# Patient Record
Sex: FEMALE | Race: WHITE | URBAN - METROPOLITAN AREA
[De-identification: names, ages, dates, MRNs, and addresses within clinical notes are randomized per-mention and may not be internally consistent; named-entity substitution may affect disease eponyms.]

---

## 2017-01-17 ENCOUNTER — HISTORICAL (OUTPATIENT)
Dept: INTERNAL MEDICINE | Facility: CLINIC | Age: 47
End: 2017-01-17

## 2017-02-10 ENCOUNTER — HISTORICAL (OUTPATIENT)
Dept: INTERNAL MEDICINE | Facility: CLINIC | Age: 47
End: 2017-02-10

## 2017-02-10 LAB
CHOLEST/HDLC SERPL: 7.9 {RATIO} (ref 0–4.4)
CREAT SERPL-MCNC: 1.92 MG/DL (ref 0.7–1.1)

## 2017-03-09 ENCOUNTER — HISTORICAL (OUTPATIENT)
Dept: INTERNAL MEDICINE | Facility: CLINIC | Age: 47
End: 2017-03-09

## 2017-04-26 ENCOUNTER — HISTORICAL (OUTPATIENT)
Dept: SURGERY | Facility: HOSPITAL | Age: 47
End: 2017-04-26

## 2017-04-26 LAB
HCT VFR BLD AUTO: 35 % (ref 35–46)
HGB BLD-MCNC: 11.4 GM/DL (ref 12–16)
LYMPHOCYTES NFR BLD AUTO: 22 % (ref 15–40)
MCH RBC QN AUTO: 28 PG (ref 26–34)
MCHC RBC AUTO-ENTMCNC: 32.6 GM/DL (ref 31–37)
MONOCYTES NFR BLD AUTO: 5 % (ref 4–12)
RBC # BLD AUTO: 4.07 X10(6)/MCL (ref 4–5.2)
WBC # SPEC AUTO: 9.1 X10(3)/MCL (ref 4.5–11)

## 2017-05-15 ENCOUNTER — HISTORICAL (OUTPATIENT)
Dept: ADMINISTRATIVE | Facility: HOSPITAL | Age: 47
End: 2017-05-15

## 2017-05-15 LAB
ALBUMIN SERPL-MCNC: 3.2 GM/DL (ref 3.4–5)
ALBUMIN/GLOB SERPL: 1 RATIO (ref 1–2)
ALP SERPL-CCNC: 78 UNIT/L (ref 20–120)
ALT SERPL-CCNC: 19 UNIT/L
APPEARANCE, UA: CLEAR
AST SERPL-CCNC: 17 UNIT/L
BACTERIA #/AREA URNS AUTO: ABNORMAL /[HPF]
BILIRUB SERPL-MCNC: 0.7 MG/DL
BILIRUB UR QL STRIP: NEGATIVE
BILIRUBIN DIRECT+TOT PNL SERPL-MCNC: <0.1 MG/DL
BILIRUBIN DIRECT+TOT PNL SERPL-MCNC: >0.6 MG/DL
BUN SERPL-MCNC: 36 MG/DL (ref 7–25)
CALCIUM SERPL-MCNC: 9.4 MG/DL (ref 8.4–10.3)
CHLORIDE SERPL-SCNC: 103 MMOL/L (ref 96–110)
CO2 SERPL-SCNC: 29 MMOL/L (ref 24–32)
COLOR UR: ABNORMAL
CREAT SERPL-MCNC: 1.63 MG/DL (ref 0.7–1.1)
CREAT UR-MCNC: 71.2 MG/DL
EST. AVERAGE GLUCOSE BLD GHB EST-MCNC: 289 MG/DL
GLOBULIN SER-MCNC: 3.6 GM/ML (ref 2.3–3.5)
GLUCOSE (UA): >1000 MG/DL
GLUCOSE SERPL-MCNC: 323 MG/DL (ref 65–99)
HBA1C MFR BLD: 11.7 % (ref 4.7–5.6)
HGB UR QL STRIP: 0.1 MG/DL
HYALINE CASTS #/AREA URNS LPF: ABNORMAL /[LPF]
INR PPP: 2.19 (ref 0.9–1.2)
KETONES UR QL STRIP: NEGATIVE
LEUKOCYTE ESTERASE UR QL STRIP: NEGATIVE
NITRITE UR QL STRIP: NEGATIVE
PH UR STRIP: 7 [PH] (ref 4.5–8)
POTASSIUM SERPL-SCNC: 4.9 MMOL/L (ref 3.6–5.2)
PROT SERPL-MCNC: 6.8 GM/DL (ref 6–8)
PROT UR QL STRIP: 300 MG/DL
PROT UR STRIP-MCNC: 286 MG/DL
PROT/CREAT UR-RTO: 4016.9 MG/GM
PROTHROMBIN TIME: 24 SECOND(S) (ref 11.9–14.4)
RBC #/AREA URNS AUTO: ABNORMAL /[HPF]
SODIUM SERPL-SCNC: 139 MMOL/L (ref 135–146)
SP GR UR STRIP: 1.01 (ref 1–1.03)
SQUAMOUS #/AREA URNS LPF: ABNORMAL /[LPF]
TSH SERPL-ACNC: 4.19 MIU/L (ref 0.5–5)
UROBILINOGEN UR STRIP-ACNC: NORMAL
WBC #/AREA URNS AUTO: ABNORMAL /HPF

## 2017-07-11 ENCOUNTER — HISTORICAL (OUTPATIENT)
Dept: ADMINISTRATIVE | Facility: HOSPITAL | Age: 47
End: 2017-07-11

## 2017-07-11 LAB
ABS NEUT (OLG): 3.67 X10(3)/MCL (ref 2.1–9.2)
ALBUMIN SERPL-MCNC: 3 GM/DL (ref 3.4–5)
ALBUMIN/GLOB SERPL: 1 RATIO (ref 1–2)
ALP SERPL-CCNC: 96 UNIT/L (ref 45–117)
ALT SERPL-CCNC: 24 UNIT/L (ref 12–78)
AST SERPL-CCNC: 15 UNIT/L (ref 15–37)
BASOPHILS # BLD AUTO: 0.08 X10(3)/MCL
BASOPHILS NFR BLD AUTO: 1 % (ref 0–1)
BILIRUB SERPL-MCNC: 0.2 MG/DL (ref 0.2–1)
BILIRUBIN DIRECT+TOT PNL SERPL-MCNC: <0.1 MG/DL
BILIRUBIN DIRECT+TOT PNL SERPL-MCNC: >0.1 MG/DL
BUN SERPL-MCNC: 43 MG/DL (ref 7–18)
CALCIUM SERPL-MCNC: 9.1 MG/DL (ref 8.5–10.1)
CHLORIDE SERPL-SCNC: 98 MMOL/L (ref 98–107)
CHOLEST SERPL-MCNC: 343 MG/DL
CHOLEST/HDLC SERPL: 10.1 {RATIO} (ref 0–4.4)
CO2 SERPL-SCNC: 28 MMOL/L (ref 21–32)
CREAT SERPL-MCNC: 2.4 MG/DL (ref 0.6–1.3)
CREAT UR-MCNC: 68 MG/DL
CREAT UR-MCNC: 68 MG/DL
EOSINOPHIL # BLD AUTO: 0.22 10*3/UL
EOSINOPHIL NFR BLD AUTO: 3 % (ref 0–5)
ERYTHROCYTE [DISTWIDTH] IN BLOOD BY AUTOMATED COUNT: 14.7 % (ref 11.5–14.5)
EST. AVERAGE GLUCOSE BLD GHB EST-MCNC: 312 MG/DL
GLOBULIN SER-MCNC: 3.9 GM/ML (ref 2.3–3.5)
GLUCOSE P FAST SERPL-MCNC: 448 MG/DL (ref 65–99)
GLUCOSE SERPL-MCNC: 448 MG/DL (ref 74–106)
HBA1C MFR BLD: 12.5 % (ref 4.2–6.3)
HCT VFR BLD AUTO: 36.7 % (ref 35–46)
HDLC SERPL-MCNC: 34 MG/DL
HGB BLD-MCNC: 11.7 GM/DL (ref 12–16)
IMM GRANULOCYTES # BLD AUTO: 0.09 10*3/UL
IMM GRANULOCYTES NFR BLD AUTO: 1 %
INR PPP: 1.92 (ref 0.9–1.2)
LDLC SERPL CALC-MCNC: ABNORMAL MG/DL (ref 0–130)
LYMPHOCYTES # BLD AUTO: 1.97 X10(3)/MCL
LYMPHOCYTES NFR BLD AUTO: 31 % (ref 15–40)
MCH RBC QN AUTO: 27.4 PG (ref 26–34)
MCHC RBC AUTO-ENTMCNC: 31.9 GM/DL (ref 31–37)
MCV RBC AUTO: 85.9 FL (ref 80–100)
MICROALBUMIN UR-MCNC: 1120 MG/L (ref 0–19)
MICROALBUMIN/CREAT RATIO PNL UR: 1647.1 MCG/MG CR (ref 0–29)
MONOCYTES # BLD AUTO: 0.39 X10(3)/MCL
MONOCYTES NFR BLD AUTO: 6 % (ref 4–12)
NEUTROPHILS # BLD AUTO: 3.67 X10(3)/MCL
NEUTROPHILS NFR BLD AUTO: 57 X10(3)/MCL
PLATELET # BLD AUTO: 275 X10(3)/MCL (ref 130–400)
PMV BLD AUTO: 10.4 FL (ref 7.4–10.4)
POTASSIUM SERPL-SCNC: 5.3 MMOL/L (ref 3.5–5.1)
PROT SERPL-MCNC: 6.9 GM/DL (ref 6.4–8.2)
PROT UR STRIP-MCNC: 149.2 MG/DL
PROT/CREAT UR-RTO: 2194.1 MG/GM
PROTHROMBIN TIME: 21.6 SECOND(S) (ref 11.9–14.4)
RBC # BLD AUTO: 4.27 X10(6)/MCL (ref 4–5.2)
SODIUM SERPL-SCNC: 135 MMOL/L (ref 136–145)
T4 FREE SERPL-MCNC: 0.72 NG/DL (ref 0.76–1.46)
TRIGL SERPL-MCNC: 1007 MG/DL
TSH SERPL-ACNC: 37.7 MIU/L (ref 0.36–3.74)
VLDLC SERPL CALC-MCNC: 201 MG/DL
WBC # SPEC AUTO: 6.4 X10(3)/MCL (ref 4.5–11)

## 2017-07-25 ENCOUNTER — HISTORICAL (OUTPATIENT)
Dept: ADMINISTRATIVE | Facility: HOSPITAL | Age: 47
End: 2017-07-25

## 2017-07-25 LAB
INR PPP: 2.09 (ref 0.9–1.2)
PROTHROMBIN TIME: 23.1 SECOND(S) (ref 11.9–14.4)

## 2017-08-15 ENCOUNTER — HISTORICAL (OUTPATIENT)
Dept: INTERNAL MEDICINE | Facility: CLINIC | Age: 47
End: 2017-08-15

## 2017-08-15 LAB
ALBUMIN SERPL-MCNC: 2.9 GM/DL (ref 3.4–5)
ALBUMIN/GLOB SERPL: 1 RATIO (ref 1–2)
ALP SERPL-CCNC: 90 UNIT/L (ref 45–117)
ALT SERPL-CCNC: 29 UNIT/L (ref 12–78)
AST SERPL-CCNC: 35 UNIT/L (ref 15–37)
BILIRUB SERPL-MCNC: 0.2 MG/DL (ref 0.2–1)
BILIRUBIN DIRECT+TOT PNL SERPL-MCNC: <0.1 MG/DL
BILIRUBIN DIRECT+TOT PNL SERPL-MCNC: >0.1 MG/DL
BUN SERPL-MCNC: 34 MG/DL (ref 7–18)
CALCIUM SERPL-MCNC: 8.9 MG/DL (ref 8.5–10.1)
CHLORIDE SERPL-SCNC: 101 MMOL/L (ref 98–107)
CO2 SERPL-SCNC: 30 MMOL/L (ref 21–32)
CREAT SERPL-MCNC: 2.1 MG/DL (ref 0.6–1.3)
EST. AVERAGE GLUCOSE BLD GHB EST-MCNC: 335 MG/DL
GLOBULIN SER-MCNC: 3.6 GM/ML (ref 2.3–3.5)
GLUCOSE SERPL-MCNC: 337 MG/DL (ref 74–106)
HBA1C MFR BLD: 13.3 % (ref 4.2–6.3)
MAGNESIUM SERPL-MCNC: 1.7 MG/DL (ref 1.8–2.4)
POTASSIUM SERPL-SCNC: 5.5 MMOL/L (ref 3.5–5.1)
PROT SERPL-MCNC: 6.5 GM/DL (ref 6.4–8.2)
SODIUM SERPL-SCNC: 137 MMOL/L (ref 136–145)

## 2017-08-18 ENCOUNTER — HISTORICAL (OUTPATIENT)
Dept: RADIOLOGY | Facility: HOSPITAL | Age: 47
End: 2017-08-18

## 2017-09-05 ENCOUNTER — HISTORICAL (OUTPATIENT)
Dept: INTERNAL MEDICINE | Facility: CLINIC | Age: 47
End: 2017-09-05

## 2017-09-05 LAB
INR PPP: 2.65 (ref 0.9–1.2)
PROTHROMBIN TIME: 27.8 SECOND(S) (ref 11.9–14.4)

## 2017-09-18 ENCOUNTER — HISTORICAL (OUTPATIENT)
Dept: RADIOLOGY | Facility: HOSPITAL | Age: 47
End: 2017-09-18

## 2017-10-17 ENCOUNTER — HISTORICAL (OUTPATIENT)
Dept: INTERNAL MEDICINE | Facility: CLINIC | Age: 47
End: 2017-10-17

## 2017-10-17 LAB
BUN SERPL-MCNC: 47 MG/DL (ref 7–18)
CALCIUM SERPL-MCNC: 9.2 MG/DL (ref 8.5–10.1)
CHLORIDE SERPL-SCNC: 105 MMOL/L (ref 98–107)
CO2 SERPL-SCNC: 29 MMOL/L (ref 21–32)
CREAT SERPL-MCNC: 2.5 MG/DL (ref 0.6–1.3)
EST. AVERAGE GLUCOSE BLD GHB EST-MCNC: 269 MG/DL
GLUCOSE SERPL-MCNC: 182 MG/DL (ref 74–106)
HBA1C MFR BLD: 11 % (ref 4.2–6.3)
INR PPP: 2.27
INR PPP: 2.27 (ref 0.9–1.2)
MAGNESIUM SERPL-MCNC: 2.1 MG/DL (ref 1.8–2.4)
POTASSIUM SERPL-SCNC: 4.9 MMOL/L (ref 3.5–5.1)
PROTHROMBIN TIME: 24.7 SECOND(S) (ref 11.9–14.4)
SODIUM SERPL-SCNC: 140 MMOL/L (ref 136–145)

## 2017-11-15 ENCOUNTER — HOSPITAL ENCOUNTER (OUTPATIENT)
Dept: EMERGENCY MEDICINE | Facility: HOSPITAL | Age: 47
End: 2017-11-16
Attending: HOSPITALIST | Admitting: HOSPITALIST

## 2017-11-15 ENCOUNTER — HISTORICAL (OUTPATIENT)
Dept: INTERNAL MEDICINE | Facility: CLINIC | Age: 47
End: 2017-11-15

## 2017-11-15 LAB
ABS NEUT (OLG): 3.69 X10(3)/MCL (ref 2.1–9.2)
ALBUMIN SERPL-MCNC: 2.9 GM/DL (ref 3.4–5)
ALBUMIN/GLOB SERPL: 1 RATIO (ref 1–2)
ALP SERPL-CCNC: 72 UNIT/L (ref 45–117)
ALT SERPL-CCNC: 29 UNIT/L (ref 12–78)
APPEARANCE, UA: ABNORMAL
AST SERPL-CCNC: 25 UNIT/L (ref 15–37)
BACTERIA #/AREA URNS AUTO: ABNORMAL /[HPF]
BASOPHILS # BLD AUTO: 0.06 X10(3)/MCL
BASOPHILS NFR BLD AUTO: 1 % (ref 0–1)
BILIRUB SERPL-MCNC: 0.2 MG/DL (ref 0.2–1)
BILIRUB UR QL STRIP: NEGATIVE
BILIRUBIN DIRECT+TOT PNL SERPL-MCNC: <0.1 MG/DL
BUN SERPL-MCNC: 31 MG/DL (ref 7–18)
CALCIUM SERPL-MCNC: 8.9 MG/DL (ref 8.5–10.1)
CHLORIDE SERPL-SCNC: 104 MMOL/L (ref 98–107)
CHOLEST SERPL-MCNC: 255 MG/DL
CHOLEST/HDLC SERPL: 5 {RATIO} (ref 0–4.4)
CO2 SERPL-SCNC: 34 MMOL/L (ref 21–32)
COLOR UR: YELLOW
CREAT SERPL-MCNC: 2.4 MG/DL (ref 0.6–1.3)
CREAT UR-MCNC: 62 MG/DL
EOSINOPHIL # BLD AUTO: 0.2 X10(3)/MCL
EOSINOPHIL NFR BLD AUTO: 3 % (ref 0–5)
ERYTHROCYTE [DISTWIDTH] IN BLOOD BY AUTOMATED COUNT: 14.6 % (ref 11.5–14.5)
EST. AVERAGE GLUCOSE BLD GHB EST-MCNC: 246 MG/DL
GLOBULIN SER-MCNC: 3.6 GM/ML (ref 2.3–3.5)
GLUCOSE (UA): >1000 MG/DL
GLUCOSE SERPL-MCNC: 367 MG/DL (ref 74–106)
HBA1C MFR BLD: 10.2 % (ref 4.2–6.3)
HCT VFR BLD AUTO: 36 % (ref 35–46)
HDLC SERPL-MCNC: 51 MG/DL
HGB BLD-MCNC: 11.1 GM/DL (ref 12–16)
HGB UR QL STRIP: 0.2 MG/DL
HYALINE CASTS #/AREA URNS LPF: ABNORMAL /[LPF]
IMM GRANULOCYTES # BLD AUTO: 0.06 10*3/UL
IMM GRANULOCYTES NFR BLD AUTO: 1 %
KETONES UR QL STRIP: NEGATIVE
LDLC SERPL CALC-MCNC: ABNORMAL MG/DL (ref 0–130)
LEUKOCYTE ESTERASE UR QL STRIP: 75 LEU/UL
LYMPHOCYTES # BLD AUTO: 1.74 X10(3)/MCL
LYMPHOCYTES NFR BLD AUTO: 28 % (ref 15–40)
MCH RBC QN AUTO: 28.2 PG (ref 26–34)
MCHC RBC AUTO-ENTMCNC: 30.8 GM/DL (ref 31–37)
MCV RBC AUTO: 91.4 FL (ref 80–100)
MICROALBUMIN UR-MCNC: 2060 MG/L (ref 0–19)
MICROALBUMIN/CREAT RATIO PNL UR: 3322.6 MCG/MG CR (ref 0–29)
MONOCYTES # BLD AUTO: 0.39 X10(3)/MCL
MONOCYTES NFR BLD AUTO: 6 % (ref 4–12)
NEUTROPHILS # BLD AUTO: 3.69 X10(3)/MCL
NEUTROPHILS NFR BLD AUTO: 60 X10(3)/MCL
NITRITE UR QL STRIP: ABNORMAL
PH UR STRIP: 6 [PH] (ref 4.5–8)
PLATELET # BLD AUTO: 324 X10(3)/MCL (ref 130–400)
PMV BLD AUTO: 10 FL (ref 7.4–10.4)
POTASSIUM SERPL-SCNC: 5.4 MMOL/L (ref 3.5–5.1)
PROT SERPL-MCNC: 6.5 GM/DL (ref 6.4–8.2)
PROT UR QL STRIP: 300 MG/DL
RBC # BLD AUTO: 3.94 X10(6)/MCL (ref 4–5.2)
RBC #/AREA URNS AUTO: ABNORMAL /[HPF]
SODIUM SERPL-SCNC: 139 MMOL/L (ref 136–145)
SP GR UR STRIP: 1.01 (ref 1–1.03)
SQUAMOUS #/AREA URNS LPF: ABNORMAL /[LPF]
T4 FREE SERPL-MCNC: 0.44 NG/DL (ref 0.76–1.46)
TRIGL SERPL-MCNC: 413 MG/DL
TSH SERPL-ACNC: 346 MIU/L (ref 0.36–3.74)
UROBILINOGEN UR STRIP-ACNC: NORMAL
VLDLC SERPL CALC-MCNC: 83 MG/DL
WBC # SPEC AUTO: 6.1 X10(3)/MCL (ref 4.5–11)
WBC #/AREA URNS AUTO: ABNORMAL /HPF

## 2017-11-16 LAB
ABS NEUT (OLG): 4.41 X10(3)/MCL (ref 2.1–9.2)
ALBUMIN SERPL-MCNC: 3.1 GM/DL (ref 3.4–5)
ALBUMIN/GLOB SERPL: 1 RATIO (ref 1–2)
ALP SERPL-CCNC: 60 UNIT/L (ref 45–117)
ALT SERPL-CCNC: 30 UNIT/L (ref 12–78)
AST SERPL-CCNC: 31 UNIT/L (ref 15–37)
BASOPHILS # BLD AUTO: 0.06 X10(3)/MCL
BASOPHILS NFR BLD AUTO: 1 % (ref 0–1)
BILIRUB SERPL-MCNC: 0.2 MG/DL (ref 0.2–1)
BILIRUBIN DIRECT+TOT PNL SERPL-MCNC: <0.1 MG/DL
BILIRUBIN DIRECT+TOT PNL SERPL-MCNC: ABNORMAL MG/DL
BUN SERPL-MCNC: 35 MG/DL (ref 7–18)
CALCIUM SERPL-MCNC: 9 MG/DL (ref 8.5–10.1)
CHLORIDE SERPL-SCNC: 99 MMOL/L (ref 98–107)
CO2 SERPL-SCNC: 35 MMOL/L (ref 21–32)
CREAT SERPL-MCNC: 2.7 MG/DL (ref 0.6–1.3)
EOSINOPHIL # BLD AUTO: 0.26 X10(3)/MCL
EOSINOPHIL NFR BLD AUTO: 4 % (ref 0–5)
ERYTHROCYTE [DISTWIDTH] IN BLOOD BY AUTOMATED COUNT: 14.9 % (ref 11.5–14.5)
GLOBULIN SER-MCNC: 3.4 GM/ML (ref 2.3–3.5)
GLUCOSE SERPL-MCNC: 237 MG/DL (ref 74–106)
HCT VFR BLD AUTO: 35.1 % (ref 35–46)
HGB BLD-MCNC: 10.7 GM/DL (ref 12–16)
IMM GRANULOCYTES # BLD AUTO: 0.04 10*3/UL
IMM GRANULOCYTES NFR BLD AUTO: 1 %
LYMPHOCYTES # BLD AUTO: 1.99 X10(3)/MCL
LYMPHOCYTES NFR BLD AUTO: 28 % (ref 15–40)
MCH RBC QN AUTO: 27.8 PG (ref 26–34)
MCHC RBC AUTO-ENTMCNC: 30.5 GM/DL (ref 31–37)
MCV RBC AUTO: 91.2 FL (ref 80–100)
MONOCYTES # BLD AUTO: 0.47 X10(3)/MCL
MONOCYTES NFR BLD AUTO: 6 % (ref 4–12)
NEUTROPHILS # BLD AUTO: 4.41 X10(3)/MCL
NEUTROPHILS NFR BLD AUTO: 61 X10(3)/MCL
PLATELET # BLD AUTO: 318 X10(3)/MCL (ref 130–400)
PMV BLD AUTO: 9.7 FL (ref 7.4–10.4)
POTASSIUM SERPL-SCNC: 4.4 MMOL/L (ref 3.5–5.1)
PROT SERPL-MCNC: 6.5 GM/DL (ref 6.4–8.2)
RBC # BLD AUTO: 3.85 X10(6)/MCL (ref 4–5.2)
SODIUM SERPL-SCNC: 139 MMOL/L (ref 136–145)
WBC # SPEC AUTO: 7.2 X10(3)/MCL (ref 4.5–11)

## 2022-04-09 ENCOUNTER — HISTORICAL (OUTPATIENT)
Dept: ADMINISTRATIVE | Facility: HOSPITAL | Age: 52
End: 2022-04-09

## 2022-04-27 VITALS
HEIGHT: 61 IN | BODY MASS INDEX: 55.32 KG/M2 | SYSTOLIC BLOOD PRESSURE: 140 MMHG | DIASTOLIC BLOOD PRESSURE: 76 MMHG | WEIGHT: 293 LBS

## 2022-04-30 NOTE — DISCHARGE SUMMARY
Patient:   Angelita Kulkarni            MRN: [id number]            FIN: [id number]               Age:   47 years     Sex:  Female     :  1970   Associated Diagnoses:   Adult hypothyroidism   Author:   Kimberli Mansfield MD      Discharge Information      Discharge Summary Information   Admitted  11/15/2017   Discharged  2017   Admitting physician     Michelle Madera MD.     Referring physician for admission     Koffi Spann MD     Discharge diagnosis     Adult hypothyroidism (RLL88-AF E03.9).     Discharge medications     OTHER MEDICATIONS (Selected)   Prescriptions  Prescribed  Lasix 20 mg oral tablet: 20 mg = 1 tab(s), Oral, BID, # 14 tab(s), 0 Refill(s), Pharmacy: [id number]  amoxicillin 500 mg oral capsule: 500 mg = 1 cap(s), Oral, TID, X 7 day(s), # 21 cap(s), 0 Refill(s), Pharmacy: [id number].        Physical Examination      Vital Signs (last 24 hrs)_____  Last Charted___________  Temp Oral     36.9 DegC  ( 03:57)  Heart Rate Peripheral   73 bpm  ( 10:49)  Resp Rate         15 br/min  ( 10:49)  SBP      103 mmHg  ( 10:49)  DBP      69 mmHg  ( 10:49)  SpO2      99 %  ( 10:49)  Weight      166.4 kg  ( 07:00)  Height      162 cm  (NOV 15 16:03)  BMI      63.25  (NOV 15 16:)     General: AAOx3, NAD, alert and cooperative, morbidly obese  HEENT: PERRLA, EOMI, normal conjunctiva  Neck: no LAD, no JVD, supple, no masses or thyromegaly  CVS: S1/S2 nml, RRR, no murmurs, rubs or gallops  Resp: CTA B/L, no rhonchi, rales, or wheezing  GI: Not distended, not tender, BS+, no guarding  : no CVA tenderness  Skin: not jaundiced, warm, no rashes  Musculoskeletal: normal ROM, no joint tenderness, normal muscular development  Extremities: 1+ peripheral edema, peripheral pulses intact  Neuro: CN II-XII grossly intact, strength and sensation symmetric and intact throughout, no focal neurological deficit      Hospital Course    Hospital Course   Admitted from: from emergency department.     Length of stay: days  2.     This is a 47 yrear old female with hsitory of hypothyroidism admitted to the hospital from medicine clinic for severe hypothyroidism.  and free t4 0.005. patient reports being compliant with levothyroxine 300 mcg but apparently pt was taking Mg hydroxide for hypomagnesemia as prescribed by her PCP. she was also found to have UTI and started her on amoxicillin. consulted endocrinology for further recommendations. recommended to continue same dose of levothyroxine and discontinue magnesium. recommended to repeat TFTs in 3 weeks. diuresed her , put out 3.5 L since admission. echo revealed normal EF 55-60%. eren has CKD stage IV. will not recommend on aggressive diuresis. explained her and family that the weight gain is secondary to severe hypothyroidism and once she starts taking the medications she will notice improvement in her symptoms. patient voiced understanding but family was upset for not keeping her in the hospital for long. Her vitals are stable and she is satting 99% on room air. explained them that even if she stays in the hospital all we do is continue her home medications. would not recommend aggressive diuresis due to her CKD. she needs to follow up with renal. referred her to endo clinic. she needs sleep study as outpt for possible sleep apnea. follow up with PCP in 1-2 weeks.      Discharge Plan   Discharge Summary Plan   Discharge Status: stable.     Discharge instructions given: to patient.     Discharge disposition: discharge to home into the care of family member.     Prescriptions: reviewed with patient, called to pharmacy.     Education and Follow-up   Counseled: patient, regarding diagnosis, regarding treatment, regarding medications.     Discharge Planning: Hypothyroidism, Follow up with Endocrine clinic Within 1 to 2 weeks; Anytime the conditions worsen, return to clinic or go to ED; [facility] -  Medicine Clinic Within 1 to 2 weeks.

## 2022-04-30 NOTE — ED PROVIDER NOTES
"   Patient:   Angelita Kulkarni            MRN: [id number]            FIN: [id number]               Age:   47 years     Sex:  Female     :  1970   Associated Diagnoses:   Adult hypothyroidism; Graves' disease with pretibial myxedema   Author:   Zana PIPER , Koffi BROWN      Basic Information   Time seen: Date & time 11/15/2017 13:42:00.   History source: Patient.   Arrival mode: Private vehicle.   Additional information: Chief Complaint from Nursing Triage Note : Chief Complaint   11/15/2017 13:25 CST     Chief Complaint           PT SENT FROM IM CLINIC FOR ADMIT. WAITING ON IN PT BED.   PT CO "FLUID BUILD UP' AND SOB. . 2 WKS.    11/15/2017 9:15 CST      Chief Complaint           Chief Complaint  .      History of Present Illness   The patient presents with difficulty breathing and weight gain and swelling .  The onset was 3  weeks ago.  The course/duration of symptoms is constant and worsening.  Degree at onset moderate.  Degree at present moderate.  There are Exacerbating factorss including exertion and lying flat.  The Relieving factors is sitting upright.  Risk factors consist of diabetes mellitus, hypertension, pulmonary embolism and hypothyroidism.  Prior episodes: rare.  Therapy today: none.  Associated symptoms: weight gain.        Review of Systems   Respiratory symptoms:  Shortness of breath.   Cardiovascular symptoms:  Peripheral edema.             Additional review of systems information: All other systems reviewed and otherwise negative.      Health Status   Allergies:    Allergic Reactions (Selected)  High  NovoLIN N- Localized swelling, mass and lump, trunk.  Severity Not Documented  Avandia- Jittery.  PredniSONE- Sob, swelling, insomnia.,    Allergies (3) Active Reaction  NovoLIN N Localized swelling, mass and lump,   trunk  Avandia Jittery  predniSONE SOB, swelling, insomnia  .      Past Medical/ Family/ Social History   Medical history:    Active  DM - Diabetes mellitus (069987939)  HTN " - Hypertension ([id number])  GERD (gastroesophageal reflux disease) ([id number])  Cataract ([id number]).   Surgical history:    Cataract Extraction Phacoemulsification (Right) on 2015 at 45 Years.  Comments:  2015 13:35 - Wenceslao REGAN, Jamilah Lim  auto-populated from documented surgical case  Cataract Extraction Phacoemulsification (Left) on 2015 at 45 Years.  Comments:  2015 14:27 - Megha Motta RN  auto-populated from documented surgical case  Myringotomy with insertion of tube (20.01) in  at 8 Years.  Comments:  2015 13:59 - Janell Du RN  bilat  kidney abcess I&D.  Kidney stone ([id number]).  Tonsillectomy and adenoidectomy ([id number]).  Operative procedure on tonsils AND/OR adenoids ([id number]).  Left Eye Laser Proceedure., Reviewed as documented in chart.   Family history:    Congestive heart disease.  Father    Father  , Reviewed as documented in chart.      Physical Examination               Vital Signs   Vital Signs   11/15/2017 13:25 CST     Temperature Oral          36.7 DegC                             Peripheral Pulse Rate     71 bpm                             Respiratory Rate          18 br/min                             SpO2                      92 %  LOW                             Oxygen Therapy            Room air                             Systolic Blood Pressure   148 mmHg  HI                             Diastolic Blood Pressure  84 mmHg    11/15/2017 9:15 CST      Temperature Oral          36.7 DegC                             Peripheral Pulse Rate     76 bpm                             Respiratory Rate          16 br/min                             Systolic Blood Pressure   140 mmHg                             Diastolic Blood Pressure  76 mmHg  .   Measurements   11/15/2017 13:25 CST     Weight Dosing             166 kg                             Weight Measured           166 kg                             Weight  Measured and Calculated in Lbs     365.96 lb                             Height/Length Dosing      162 cm                             Height/Length Measured    162 cm                             Body Mass Index Measured  63.25 kg/m2    11/15/2017 9:15 CST      Weight Dosing             166.8 kg                             Weight Measured           166.8 kg                             Weight Measured and Calculated in Lbs     367.73 lb                             Weight Measured and Calculated in Lbs     367.73 lb                             Height/Length Dosing      156.20 cm                             Height/Length Measured    156.20 cm                             BSA Measured              2.69 m2                             Body Mass Index Measured  68.37 kg/m2  .   Basic Oxygen Information   11/15/2017 13:25 CST     SpO2                      92 %  LOW                             Oxygen Therapy            Room air  .   General:  Alert, no acute distress.    Skin:  Warm, dry.    Eye:  Pupils are equal, round and reactive to light, extraocular movements are intact.    Ears, nose, mouth and throat:  Oral mucosa moist.   Neck:  Trachea midline.   Cardiovascular:  Regular rate and rhythm, No murmur, bilateral LE edema 3+.    Respiratory:  Lungs are clear to auscultation, respirations are non-labored, breath sounds are equal.    Gastrointestinal:  Soft, Nontender, distention.    Neurological:  Alert and oriented to person, place, time, and situation, No focal neurological deficit observed.    Psychiatric:  Cooperative, appropriate mood & affect.       Medical Decision Making   Electrocardiogram:  Time 11/15/2017 13:57:00, rate 69, normal sinus rhythm, LAD, low voltage.       Impression and Plan   Diagnosis   Adult hypothyroidism (DYN17-YX E03.9)   Graves' disease with pretibial myxedema (HZH20-AZ E05.00)      Calls-Consults   -  11/15/2017 13:50:00 , Michelle Madera MD, internal medicine.    Plan   Disposition:  Admit time  11/15/2017 13:50:00.

## 2022-05-02 NOTE — HISTORICAL [facility]
This is a historical note converted from [employer]. Formatting and pictures may have been removed.  Please reference [employer] for original formatting and attached multimedia. Chief Complaint  PT SENT FROM IM CLINIC FOR ADMIT. WAITING ON IN PT BED. PT CO FLUID BUILD UP AND SOB. . 2 WKS.  History of Present Illness  This is a 47 year old  female with past medical history of uncontrolled diabetes, hypothyroidism, CKD stage IV,?hypertension, HLD, morbid central obesity, GERD, CHF, Hx of PE presented to the medicine clinic today complaining of worsening shortness of breath over the past 3 weeks. patient underwent echo in 2016 which revealed EF 50%, mild TR. She states that she started noticing swelling of her legs, SOB over the past 3 weeks, reports that she used to have mild swelling of her legs in the past which would go down on elevation of her legs but over the past 3 weeks it was getting worse, she complains of dyspnea on walking for 100feet, baseline - no dyspnea on exertion.Also reports orthopnea, has been sleeping in a recliner over the past 2 weeks, PND, does not report any weight gain as per patient and her  but on reviewing her chart she went up from 280 pounds in august to 360 pounds now. denies any chest pain, palpitations. reports taking all her medications as prescribed. She states that suring her last clinic visit her PCP increased her levothyroxine from 125 to 300 mcg as her TSH was in 30s. She was also found to have low mag levels and was placed on Mg hydroxide at the same time. pt reports taking both of them but hasnt been feeling well with all the above symtoms and went to the ER and was prescribed lasix for 1 week, reports taking it but it dint help her either. Also complains of dysuria. denies fever, chills, sweats, abdominl pain.  Review of Systems  Constitutional: no fevers, night sweats, chills or fatigue  HEENT: no acute vision changes or photophobia, no hearing loss  CVS: no chest  pain, palpitations,?+ orthopnea, + PND, + swelling of her bilateral legs  Resp:?+ SOB, no cough, or wheezing  GI: no abd pain, nausea, vomiting, diarrhea  : no dysuria, urinary incontinence  Musculoskeletal: no joint stiffness, pain,? weakness, + BLE swelling  Skin: no rashes, lesions  Neuro: no headaches, seizures, numbness or syncope  Psych: no depression or anxiety  Physical Exam  Vitals & Measurements  T:?36.7? ?C ?(Oral)? HR:?71?(Peripheral)? RR:?18? BP:?148/84? SpO2:?92%? WT:?166?kg? WT:?166?kg?  General: AAOx3, NAD, alert and cooperative  HEENT: PERRLA, EOMI, normal conjunctiva  Neck: no LAD, JVD could not be appreciated due to thick neck, supple, no masses or thyromegaly  CVS: S1/S2 nml, RRR, no murmurs, rubs or gallops, 2+ BLE pitting edema  Resp: CTA B/L, no rhonchi, rales, or wheezing  GI: Not distended, not tender, BS+, no guarding  Skin: not jaundiced, warm, no rashes  Extremities:?2+?peripheral edema, peripheral pulses intact  Neuro: CN II-XII grossly intact, strength and sensation symmetric and intact throughout, no focal neurological deficits  Assessment/Plan  1. CHF exacerbation  could be secondary to severe hypothyroidism with TSH of 346  last echo in 2016- EF 50%, mild TR  ordered pro BNP  will diurese her with lasix 40 iv BID  she has been on low dose coreg at home, will continue it,started on lisinopril 2.5 mg daily  monitor Is and Os and daily weights  ordered stat echo, f/u on the results. lexiscan in the past was negative  ?  2. Severe hypothyroidism  TSH of 346, free T4 0.44  pt reports taking 300mcg of levothyroxine but has been on Milk of magnesia which could have decreased the absorption  stopped the Mg hydroxide  consulted endo for further recommendations.  continue 300mcg of levothyroxine for now.  ?  3. HLD  on statins and tricor  LDl could not be calculated  ?  4. hypertension  stable  ?  5. Hyperkalemia  K 5.4  not on potassium supplements  ordered EKG  gave 5 units iv insulin,  kayexalate  ?  6. uncontrolled type II DM  on lantus 55 units BID daily and novolog 35 units TIDAC  last A1c 10.2 and tradjenta  CBGs in 300s today  ?  7. 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  8. UTI  started on macrobid  ?  9. morbid obesity  ?  10. possible GERI  needs outpt sleep study  ?  11. hx of PE  on coumadin  INR subtherapeutic  continue coumdin  ?  prophylaxis- coumadin for PE  ?   Problem List/Past Medical History  Ongoing  Cataract  DM - Diabetes mellitus  GERD (gastroesophageal reflux disease)  Hernia  HTN - Hypertension  PDR (proliferative diabetic retinopathy)  Syncopal episodes  Historical  Procedure/Surgical History  Bone marrow; biopsy, needle or trocar (04/26/2017)  Extraction of Iliac Bone Marrow, Percutaneous Approach, Diagnostic (04/26/2017)  Cataract Extraction Phacoemulsification (Right) (05/26/2015)  Extracapsular cataract removal with insertion of intraocular lens prosthesis (1 stage procedure), manual or mechanical technique (eg, irrigation and aspiration or phacoemulsification) (05/26/2015)  Insertion of intraocular lens prosthesis at time of cataract extraction, one-stage (05/26/2015)  Phacoemulsification and aspiration of cataract (05/26/2015)  Cataract Extraction Phacoemulsification (Left) (04/28/2015)  Extracapsular cataract removal with insertion of intraocular lens prosthesis (1 stage procedure), manual or mechanical technique (eg, irrigation and aspiration or phacoemulsification) (04/28/2015)  Insertion of intraocular lens prosthesis at time of cataract extraction, one-stage (04/28/2015)  Phacoemulsification and aspiration of cataract (04/28/2015)  Myringotomy with insertion of tube (1978)  kidney abcess I&D  Kidney stone  Left Eye Laser Proceedure  Operative procedure on tonsils AND/OR adenoids  Tonsillectomy and adenoidectomy  Medications  Inpatient  Coreg  3.125 mg oral tablet, 3.125 mg= 1 tab(s), Oral, BID  fenofibrate, 48 mg, Oral, Daily  gabapentin 100 mg oral capsule, 100 mg= 1 cap(s), Oral, TID  Lantus 100 U/mL (per pen), 55 units= 0.55 mL, Subcutaneous, BID  Lasix, 40 mg= 4 mL, IV Push, BID  levothyroxine 150 mcg (0.15 mg) oral tablet, 300 mcg= 12 tab(s), Oral, Daily  NovoLog 100 units/mL subcutaneous solution, 35 units, Subcutaneous, TIDAC  simvastatin 20 mg oral tablet, 20 mg= 1 tab(s), Oral, qPM  Tradjenta 5 mg oral tablet, 5 mg, Oral, Daily  warfarin, 7.5 mg= 1 tab(s), Oral, At Bedtime  Home  Compression stockings, See Instructions  Coreg 3.125 mg oral tablet, 3.125 mg= 1 tab(s), Oral, BID, 6 refills  gabapentin 100 mg oral capsule, 100 mg= 1 cap(s), Oral, TID, 11 refills  gemfibrozil 600 mg oral tablet, 600 mg= 1 tab(s), Oral, BID, 6 refills  Glucometer Test Strips, See Instructions, 11 refills  Insulin syringes 100 unit 1/2, See Instructions, 11 refills  Lancets, See Instructions, 11 refills  Lantus 100 units/mL subcutaneous solution, See Instructions, 11 refills  levothyroxine 300 mcg (0.3 mg) oral tablet, 300 mcg= 1 tab(s), Oral, Daily, 6 refills  magnesium oxide 400 mg oral tablet, 400 mg= 1 tab(s), Oral, Daily, 2 refills  Misc Rx Supply, See Instructions  NovoLog 100 units/mL subcutaneous solution, 35 units, Subcutaneous, TIDAC, 11 refills  SIMVASTATIN TAB 20MG, 20 mg= 1 tab(s), Oral, qPM  Tradjenta 5 mg oral tablet, 5 mg= 1 tab(s), Oral, Daily, 6 refills  TriCor 48 mg oral tablet, 48 mg= 1 tab(s), Oral, Daily, 6 refills  warfarin 7.5 mg oral tablet, 7.5 mg= 1 tab(s), Oral, At Bedtime  Wheelchair-22 heavy duty, See Instructions  Allergies  NovoLIN N?(Localized swelling, mass and lump, trunk)  Avandia?(Jittery)  predniSONE?(SOB, swelling, insomnia)  Social History  Alcohol - 08/15/2017  Past  Employment/School - 08/15/2017  Unemployed, Work/School description: not working. Highest education level: Some college. Operates hazardous equipment:  No.  Exercise - 08/15/2017  Exercise frequency: 1-2 times/week. Self assessment: Poor condition. Exercise type: Walking.  Home/Environment - 08/15/2017  Lives with room mate. Living situation: Home/Independent. Home equipment: Glucose monitoring. Alcohol abuse in household: No. Substance abuse in household: No. Smoker in household: No. Injuries/Abuse/Neglect in household: No. Feels unsafe at home: No. Safe place to go: Yes. Family/Friends available for support: Yes. Concern for family members at home: No. Major illness in household: No. Financial concerns: No. TV/Computer concerns: No.  Nutrition/Health - 08/15/2017  Type of diet: COUMADIN LOW SODIUM. Diabetic  Diabetic, Caffeine intake amount: none. Wants to lose weight: Yes. Sleeping concerns: No. Feels highly stressed: No.  Other - 08/15/2017  Sexual - 08/15/2017  Substance Abuse - 08/15/2017  Never  Tobacco - 08/15/2017  Never smoker  Family History  Congestive heart disease.: Father.  : Father.      due to CKD stage IV switched her to amoxicillin instead of macrobid. f/ u cultures   I reviewed past medical history , lab data, and radiographic findings. Patient examined on Rounds .  Case discussed with Residents. I agree with assessment and plan of care.

## 2022-05-02 NOTE — HISTORICAL [facility] CERNER
This is a historical note converted from [employer]. Formatting and pictures may have been removed.  Please reference [employer] for original formatting and attached multimedia. Chief Complaint  follow up & medicine refill  History of Present Illness  46 y/o WF presents for f/u visit for DM2, HTN, HLD. Here for?BMB results. Has been checking CBGs and running 200s, but mostly in afternoons when does elevate. No hypoglycemic episodes.  Review of Systems  Constitutional: negative except as stated in HPI  Eye: negative except as stated in HPI  ENMT: negative except as stated in HPI  Respiratory: negative except as stated in HPI  Cardiovascular: negative except as stated in HPI  Gastrointestinal: negative except as stated in HPI  Genitourinary: negative except as stated in HPI  Hema/Lymph: negative except as stated in HPI  Endocrine: negative except as stated in HPI  Immunologic: negative except as stated in HPI  Musculoskeletal: negative except as stated in HPI  Integumentary: negative except as stated in HPI  Neurologic: negative except as stated in HPI  ?   All Other ROS_ ?negative except as stated in HPI  Physical Exam  Vitals & Measurements  T:?36.7? ?C ?(Oral)? HR:?80?(Peripheral)? RR:?20? BP:?142/86? HT:?162.56?cm? HT:?162.56?cm? WT:?146.5?kg? WT:?146.5?kg? BMI:?55.44?  General: Alert and oriented, No acute distress.  Eye: Pupils are equal, round and reactive to light, Extraocular movements are intact.  HENT: Normocephalic. frontal baldness  Respiratory: Lungs are clear to auscultation, Respirations are non-labored, Breath sounds are equal, Symmetrical chest wall expansion.  Cardiovascular: Normal rate, Regular rhythm, No murmur. Pedal pulses 2+  Musculoskeletal: Jhony plantar fascia TTP, worse with dorsiflexion. No sores, lesions, blisters or skin breakdown. Sensation intact  Integumentary: Warm, Dry, Intact. + hirsutism. Thining of hair to scalp  Neurologic: No focal deficits, Cranial Nerves II-XII are grossly  intact.  Assessment/Plan  Diabetes mellitus  uncontrolled  increase levemir back to 60 units qam, cont 50 units qpm  cont novolog at current dosage  start tradjenta  ADA diet compliance  CBG log  discussed insulin pump therapy with pt and she is interested in proceeding with such  Ordered:  C-Peptide, Serum-[facility] [id number], Routine collect, *Est. 05/15/17 3:00:00 CDT, Blood, Order for future visit, *Est. Stop date 05/15/17 3:00:00 CDT, Lab Collect, Diabetes mellitus, Sometime Before, 05/15/17 14:42:00 CDT  Comprehensive Metabolic Panel, Routine collect, *Est. 08/15/17 3:00:00 CDT, Blood, Order for future visit, *Est. Stop date 08/15/17 3:00:00 CDT, Lab Collect, Diabetes mellitus  DM type 2 causing CKD stage 3, 3, month(s), In Approximately, 05/15/17 14:30:00 CDT  Glucose Level Fasting Specimen, Routine collect, *Est. 05/15/17 3:00:00 CDT, Blood, Order for future visit, *Est. Stop date 05/15/17 3:00:00 CDT, Lab Collect, Diabetes mellitus, Sometime Before, 05/15/17 14:42:00 CDT  Hemoglobin A1C UHC, Routine collect, *Est. 08/15/17 3:00:00 CDT, Blood, Order for future visit, *Est. Stop date 08/15/17 3:00:00 CDT, Lab Collect, Diabetes mellitus  DM type 2 causing CKD stage 3, 3, month(s), In Approximately, 05/15/17 14:30:00 CDT  Hemoglobin A1C UHC, Routine collect, 05/15/17 13:28:00 CDT, Blood, Stop date 05/15/17 13:28:00 CDT, Lab Collect, Diabetes mellitus  DM type 2 causing CKD stage 3, 05/15/17 13:28:00 CDT  Protein/Creatinine Ratio Urine, Routine collect, Urine, 05/15/17 13:28:00 CDT, Stop date 05/15/17 13:28:00 CDT, Nurse collect, Diabetes mellitus  DM type 2 causing CKD stage 3, 05/15/17 13:28:00 CDT  Thyroid Stimulating Hormone, Routine collect, 05/15/17 13:28:00 CDT, Blood, Stop date 05/15/17 13:28:00 CDT, Lab Collect, Diabetes mellitus  DM type 2 causing CKD stage 3, 05/15/17 13:28:00 CDT  Urinalysis Microscopic UHC, Routine collect, Urine, Collected, 05/15/17 14:03:00 CDT, Stop date 05/15/17 14:03:00 CDT,  Nurse collect, Diabetes mellitus  DM type 2 causing CKD stage 3  Urinalysis with Microscopic if Indicated, Routine collect, Urine, 05/15/17 13:28:00 CDT, Stop date 05/15/17 13:28:00 CDT, Nurse collect, Diabetes mellitus  DM type 2 causing CKD stage 3, 05/15/17 13:28:00 CDT  ?  GERD (gastroesophageal reflux disease)  ppi  gerd precautions  ?  HTN - Hypertension  at goal  cont current meds  low sodium diet  enc regular exercise as tolerated along with maintaining healthy weight  enc smoking cessation  refrain from excessive ETOH consumption  ?  Pulmonary embolus  cont warfarin  ?  Well adult exam  labs  ?  Orders:  Internal Referral to Hematology/Oncology, MGUS, *Est. 05/15/17 3:00:00 CDT, Future Visit?, MGUS (monoclonal gammopathy of unknown significance)  ?  MGUS  -per path report  -refer to heme/onc for evalution  ?  RTC 3 mos   Problem List/Past Medical History  Cataract  DM - Diabetes mellitus  GERD (gastroesophageal reflux disease)  Hernia  HTN - Hypertension  Syncopal episodes  Historical  No historical problems  Procedure/Surgical History  Bone marrow; biopsy, needle or trocar (04/26/2017), Extraction of Iliac Bone Marrow, Percutaneous Approach, Diagnostic (04/26/2017), Cataract Extraction Phacoemulsification (Right) (05/26/2015), Extracapsular cataract removal with insertion of intraocular lens prosthesis (1 stage procedure), manual or mechanical technique (eg, irrigation and aspiration or phacoemulsification) (05/26/2015), Insertion of intraocular lens prosthesis at time of cataract extraction, one-stage (05/26/2015), Phacoemulsification and aspiration of cataract (05/26/2015), Cataract Extraction Phacoemulsification (Left) (04/28/2015), Extracapsular cataract removal with insertion of intraocular lens prosthesis (1 stage procedure), manual or mechanical technique (eg, irrigation and aspiration or phacoemulsification) (04/28/2015), Insertion of intraocular lens prosthesis at time of cataract extraction,  one-stage (04/28/2015), Phacoemulsification and aspiration of cataract (04/28/2015), Myringotomy with insertion of tube (1978), kidney abcess I&D, Kidney stone, Operative procedure on tonsils AND/OR adenoids, Tonsillectomy and adenoidectomy.  Medications  Coreg 3.125 mg oral tablet, 3.125 mg, 1 tab(s), Oral, BID, 6 refills  gabapentin 100 mg oral capsule, 100 mg, 1 cap(s), Oral, TID, 11 refills  GEMFIBROZIL TAB 600MG, 600 mg, 1 tab(s), Oral, BID  Glucometer Test Strips, See Instructions, 11 refills  HYDROCO/APAP TAB 5-325MG  Insulin syringes 100 unit 1/2, See Instructions, 11 refills  Lancets, See Instructions, 11 refills  Levemir 100 units/mL subcutaneous solution, See Instructions, 11 refills  LEVOTHYROXIN TAB 300MCG  levothyroxine 125 mcg (0.125 mg) oral tablet, 250 mcg, 2 tab(s), Oral, Daily, 6 refills  LISINOPRIL TAB 10MG, 10 mg, 1 tab(s), Oral, Daily  Misc Rx Supply, See Instructions  NIACIN ER TAB 1000MG  NovoLog 100 units/mL subcutaneous solution, 25 units, Subcutaneous, TIDAC, 11 refills  simvastatin 20 mg oral tablet, 20 mg, 1 tab(s), Oral, Once a day (at bedtime), 6 refills  Tradjenta 5 mg oral tablet, 5 mg, 1 tab(s), Oral, Daily, 6 refills  TriCor 48 mg oral tablet, 48 mg, 1 tab(s), Oral, Daily, 6 refills  warfarin 7.5 mg oral tablet, 7.5 mg, 1 tab(s), Oral, At Bedtime  warfarin 7.5 mg oral tablet, 7.5 mg, 1 tab(s), Oral, At Bedtime  Allergies  NovoLIN N?(Localized swelling, mass and lump, trunk)  Avandia?(Jittery)  Social History  Alcohol  denies alcohol use  Employment/School  Unemployed, Work/School description: not working. Highest education level: Some college. Operates hazardous equipment: No.  Exercise  Exercise frequency: 1-2 times/week. Self assessment: Poor condition. Exercise type: Walking.  Home/Environment  Lives with room mate. Living situation: Home/Independent. Home equipment: Glucose monitoring. Alcohol abuse in household: No. Substance abuse in household: No. Smoker in household: No.  Injuries/Abuse/Neglect in household: No. Feels unsafe at home: No. Safe place to go: Yes. Family/Friends available for support: Yes. Concern for family members at home: No. Major illness in household: No. Financial concerns: No. TV/Computer concerns: No.  Nutrition/Health  Type of diet: COUMADIN LOW SODIUM. Diabetic  Diabetic, Caffeine intake amount: none. Wants to lose weight: Yes. Sleeping concerns: No. Feels highly stressed: No.  Other  Sexual  Substance Abuse  Prescription medications, denies substance use  Tobacco  denies tobacco use  Family History  Congestive heart disease.: Father.  : Father.

## 2022-05-02 NOTE — HISTORICAL [facility]
This is a historical note converted from [employer]. Formatting and pictures may have been removed.  Please reference [employer] for original formatting and attached multimedia. Chief Complaint  PT SENT FROM  CLINIC FOR ADMIT. WAITING ON IN PT BED. PT CO FLUID BUILD UP AND SOB. . 2 WKS.  Reason for Consultation  Hypothyroidism, abnormal lab work  History of Present Illness  47-year-old  female with past medical history of insulin-dependent diabetes mellitus, hypothyroidism, CKD, morbid obesity, PE was admitted from medicine clinic on 11/15/17 after she had presented there with worsening shortness of breath for the last 3 weeks. ?Patient reports that along with worsening shortness of breath she had also noted swelling of her legs. ?Reported dyspnea on exertion and had been sleeping in a recliner for the last few weeks. ?She has history of hypothyroidism for years. ?She is unsure of cause of hypothyroidism; TPO antibodies have been normal in February 2017. ?She was previously on levothyroxine 300??g daily for the last few years at least per record. ?Sometime earlier this year dose was adjusted to 250??g daily; patient unsure when exactly. ?Finally, she saw her PCP in August 2017 and her TSH was 37.7 and free T4 0.7 to thus dosage was again increased back to 300??g daily. ?Around the same time patient was also started on magnesium hydroxide and she had been taking it close to when she takes Synthroid. ?On admission her laboratory revealed TSH of 346 and free T4 of 0.44.  Review of Systems  Constitutional: no fevers, night sweats, chills or fatigue  HEENT: no acute vision changes or photophobia, no hearing loss  CVS: no chest pain, palpitations, or orthopnea  Resp: +SOB, no?cough, or wheezing  GI: no abd pain, nausea, vomiting or diarrhea  : no dysuria, urinary incontinence  Musculoskeletal: no joint stiffness, pain, swelling or weakness  Skin: no rashes, lesions  Neuro: no headaches, seizures, numbness or  syncope  Psych: no depression or anxiety  Physical Exam  Vitals & Measurements  T:?36.9? ?C ?(Oral)? TMIN:?36.3? ?C ?(Oral)? TMAX:?36.9? ?C ?(Oral)? HR:?66?(Peripheral)? HR:?66?(Monitored)? RR:?18? BP:?123/85? SpO2:?97%? WT:?166?kg? WT:?166?kg?  General: AAOx3, NAD, alert and cooperative, morbidly obese  HEENT: PERRLA, EOMI, normal conjunctiva  Neck: no LAD, no JVD, supple, no masses or thyromegaly  CVS: S1/S2 nml, RRR, no murmurs, rubs or gallops  Resp: CTA B/L, no rhonchi, rales, or wheezing  GI: Not distended, not tender, BS+, no guarding  : no CVA tenderness  Skin: not jaundiced, warm, no rashes  Musculoskeletal: normal ROM, no joint tenderness, normal muscular development  Extremities: 1+ peripheral edema, peripheral pulses intact  Neuro: CN II-XII grossly intact, strength and sensation symmetric and intact throughout, no focal neurological deficits  Assessment/Plan  1) Hypothyroidism  - Less likely myxedema coma as normothermic, normotensive  -  and free T4 0.44  - had dose adjusted from 250 mcg daily in August 2017 to 300 mcg daily  - Reports compliance but had been taking with magnesium oxide; were able to find in literature a few case reports of decreased synthroid absorption with mag oxide  - Recommend stopping mag oxide on d/c or at least take 4 hours after synthroid and continue current synthroid dosage (300 mcg)  - Repeat TFTs in 3 weeks to re-assess; if improving then can adjust dosage as needed. If not improving can evaluate further  ?  2) CKD  3) DM  - Will defer management to primary but recommend increasing insulin for better glycemic control  - Has CKD and proteinuria; recommend following up in renal clinic as outpatient  ?  Morbid obesity noted; reports snoring at night. Recommend sleep study as outpatient in a few months once TFTs have normalized  ?  Thank you for the consult; please call with any further questions.   Problem List/Past Medical History  Ongoing  Cataract  DM - Diabetes  mellitus  GERD (gastroesophageal reflux disease)  Hernia  HTN - Hypertension  PDR (proliferative diabetic retinopathy)  Syncopal episodes  Historical  Procedure/Surgical History  Bone marrow; biopsy, needle or trocar (04/26/2017)  Extraction of Iliac Bone Marrow, Percutaneous Approach, Diagnostic (04/26/2017)  Cataract Extraction Phacoemulsification (Right) (05/26/2015)  Extracapsular cataract removal with insertion of intraocular lens prosthesis (1 stage procedure), manual or mechanical technique (eg, irrigation and aspiration or phacoemulsification) (05/26/2015)  Insertion of intraocular lens prosthesis at time of cataract extraction, one-stage (05/26/2015)  Phacoemulsification and aspiration of cataract (05/26/2015)  Cataract Extraction Phacoemulsification (Left) (04/28/2015)  Extracapsular cataract removal with insertion of intraocular lens prosthesis (1 stage procedure), manual or mechanical technique (eg, irrigation and aspiration or phacoemulsification) (04/28/2015)  Insertion of intraocular lens prosthesis at time of cataract extraction, one-stage (04/28/2015)  Phacoemulsification and aspiration of cataract (04/28/2015)  Myringotomy with insertion of tube (1978)  kidney abcess I&D  Kidney stone  Left Eye Laser Proceedure  Operative procedure on tonsils AND/OR adenoids  Tonsillectomy and adenoidectomy  Medications  Home  Compression stockings, See Instructions  Coreg 3.125 mg oral tablet, 3.125 mg= 1 tab(s), Oral, BID, 6 refills  gabapentin 100 mg oral capsule, 100 mg= 1 cap(s), Oral, TID, 11 refills  gemfibrozil 600 mg oral tablet, 600 mg= 1 tab(s), Oral, BID, 6 refills  Glucometer Test Strips, See Instructions, 11 refills  Insulin syringes 100 unit 1/2, See Instructions, 11 refills  Lancets, See Instructions, 11 refills  Lantus 100 units/mL subcutaneous solution, See Instructions, 11 refills  levothyroxine 300 mcg (0.3 mg) oral tablet, 300 mcg= 1 tab(s), Oral, Daily, 6 refills  magnesium oxide 400 mg oral  tablet, 400 mg= 1 tab(s), Oral, Daily, 2 refills  Misc Rx Supply, See Instructions  NovoLog 100 units/mL subcutaneous solution, 35 units, Subcutaneous, TIDAC, 11 refills  SIMVASTATIN TAB 20MG, 20 mg= 1 tab(s), Oral, qPM  Tradjenta 5 mg oral tablet, 5 mg= 1 tab(s), Oral, Daily, 6 refills  TriCor 48 mg oral tablet, 48 mg= 1 tab(s), Oral, Daily, 6 refills  warfarin 7.5 mg oral tablet, 7.5 mg= 1 tab(s), Oral, At Bedtime  Wheelchair-22 heavy duty, See Instructions  Allergies  NovoLIN N?(Localized swelling, mass and lump, trunk)  Avandia?(Jittery)  predniSONE?(SOB, swelling, insomnia)  Social History  Alcohol - 08/15/2017  Past  Substance Abuse - 08/15/2017  Never  Tobacco - 08/15/2017  Never smoker  Family History  Congestive heart disease.: Father.  : Father.  Lab Results  General Lab  BUN:?35 mg/dL?High (17 03:10:23)  Creatinine:?2.7 mg/dL?High (17 03:10:23)  Glucose Lvl:?237 mg/dL?High (17 03:10:23)  Hct: 35.1 % (17 03:10:23)  Hgb:?10.7 gm/dL?Low (17 03:10:23)  WBC: 7.2 x10(3)/mcL (17 03:10:23)  Platelet: 318 x10(3)/mcL (17 03:10:23)  Potassium Lvl: 4.4 mmol/L (17 03:10:23)  Sodium Lvl: 139 mmol/L (17 03:10:23)      I saw the patient with the resident and discussed the case, assisted with the development of the assessment and agree with the plan of care.? Pt has persistent hypothyroidism, etiology unknown w/ comorbid nephrotic range proteinuria, morbid obesity.? Had been taking magnesium for constipation with her LT4 which along w/ the nephrotic syndrome may have contributed to decreased absorption of LT4 and increased clearance respectively.? Settled on plan to commit to 300mcg per day dose, counseled pt on how to properly take LT4 separate from other meds including mag and food,?and recommend recheck TSH in the next 3 weeks.? If TSH has not significantly improved by more than 20% may need to consider LT4 dose titration.? If however it has improved then  would follow still on same dose of LT4 and recheck TSH once have fully reached steady state roughly 5-6 weeks from now and continue to titrate to goal accordingly.? Regarding the nephrotic range proteinuria/syndrome, consider at some point renal eval to ensure appropriate eval and tx.

## 2022-09-16 ENCOUNTER — HISTORICAL (OUTPATIENT)
Dept: ADMINISTRATIVE | Facility: HOSPITAL | Age: 52
End: 2022-09-16